# Patient Record
Sex: FEMALE | URBAN - METROPOLITAN AREA
[De-identification: names, ages, dates, MRNs, and addresses within clinical notes are randomized per-mention and may not be internally consistent; named-entity substitution may affect disease eponyms.]

---

## 2023-05-03 ENCOUNTER — TELEPHONE (OUTPATIENT)
Dept: PULMONOLOGY | Facility: CLINIC | Age: 73
End: 2023-05-03

## 2023-05-03 NOTE — CONFIDENTIAL NOTE
ILD New Patient Referral: Pre visit communication    Patient: Rosa Mejia  Reason for Referral: ILD  Referring Physician: Self    Chest CT scan: YES. Date-April 2023. Location-[place], [place].  Biopsy: NO  PFT's:No  Additional testing: NO    Current symptoms: Mild SOB. Re accruing bhroncitis. Cough  Current related prescriptions: No    Supplemental oxygen? No    In review of apparent records and conversation with patient; recommend first visit with ILD provider be conducted :  In person visit  Testing needed: CT scan and Full PFT's and walk    Additional notes: Clinic coordinator will retain CT images from [facility]. ILD provider to review when received.                                                             Stacie Joseph RN

## 2023-05-08 DIAGNOSIS — J84.9 ILD (INTERSTITIAL LUNG DISEASE) (H): Primary | ICD-10-CM

## 2023-07-19 ENCOUNTER — OFFICE VISIT (OUTPATIENT)
Dept: PULMONOLOGY | Facility: CLINIC | Age: 73
End: 2023-07-19
Attending: INTERNAL MEDICINE
Payer: MEDICARE

## 2023-07-19 ENCOUNTER — LAB (OUTPATIENT)
Dept: LAB | Facility: CLINIC | Age: 73
End: 2023-07-19
Payer: MEDICARE

## 2023-07-19 VITALS
OXYGEN SATURATION: 97 % | DIASTOLIC BLOOD PRESSURE: 77 MMHG | HEART RATE: 75 BPM | BODY MASS INDEX: 20.91 KG/M2 | HEIGHT: 63 IN | WEIGHT: 118 LBS | SYSTOLIC BLOOD PRESSURE: 110 MMHG

## 2023-07-19 DIAGNOSIS — J84.9 ILD (INTERSTITIAL LUNG DISEASE) (H): ICD-10-CM

## 2023-07-19 DIAGNOSIS — R05.3 CHRONIC COUGH: ICD-10-CM

## 2023-07-19 DIAGNOSIS — J84.9 ILD (INTERSTITIAL LUNG DISEASE) (H): Primary | ICD-10-CM

## 2023-07-19 LAB
6 MIN WALK (FT): 1350 FT
6 MIN WALK (M): 411 M
CK SERPL-CCNC: 104 U/L (ref 26–192)
CRP SERPL-MCNC: <3 MG/L
ERYTHROCYTE [SEDIMENTATION RATE] IN BLOOD BY WESTERGREN METHOD: 13 MM/HR (ref 0–30)

## 2023-07-19 PROCEDURE — 86160 COMPLEMENT ANTIGEN: CPT | Performed by: INTERNAL MEDICINE

## 2023-07-19 PROCEDURE — 82085 ASSAY OF ALDOLASE: CPT | Performed by: INTERNAL MEDICINE

## 2023-07-19 PROCEDURE — 99000 SPECIMEN HANDLING OFFICE-LAB: CPT | Performed by: PATHOLOGY

## 2023-07-19 PROCEDURE — 86235 NUCLEAR ANTIGEN ANTIBODY: CPT | Performed by: INTERNAL MEDICINE

## 2023-07-19 PROCEDURE — 94375 RESPIRATORY FLOW VOLUME LOOP: CPT | Performed by: INTERNAL MEDICINE

## 2023-07-19 PROCEDURE — 86331 IMMUNODIFFUSION OUCHTERLONY: CPT | Performed by: INTERNAL MEDICINE

## 2023-07-19 PROCEDURE — G0463 HOSPITAL OUTPT CLINIC VISIT: HCPCS | Performed by: INTERNAL MEDICINE

## 2023-07-19 PROCEDURE — 94726 PLETHYSMOGRAPHY LUNG VOLUMES: CPT | Performed by: INTERNAL MEDICINE

## 2023-07-19 PROCEDURE — 86225 DNA ANTIBODY NATIVE: CPT | Performed by: INTERNAL MEDICINE

## 2023-07-19 PROCEDURE — 82550 ASSAY OF CK (CPK): CPT | Performed by: PATHOLOGY

## 2023-07-19 PROCEDURE — 99205 OFFICE O/P NEW HI 60 MIN: CPT | Mod: 25 | Performed by: INTERNAL MEDICINE

## 2023-07-19 PROCEDURE — 86038 ANTINUCLEAR ANTIBODIES: CPT | Performed by: INTERNAL MEDICINE

## 2023-07-19 PROCEDURE — 86606 ASPERGILLUS ANTIBODY: CPT | Performed by: INTERNAL MEDICINE

## 2023-07-19 PROCEDURE — 86140 C-REACTIVE PROTEIN: CPT | Performed by: PATHOLOGY

## 2023-07-19 PROCEDURE — 86431 RHEUMATOID FACTOR QUANT: CPT | Performed by: INTERNAL MEDICINE

## 2023-07-19 PROCEDURE — 86200 CCP ANTIBODY: CPT | Performed by: INTERNAL MEDICINE

## 2023-07-19 PROCEDURE — 94729 DIFFUSING CAPACITY: CPT | Performed by: INTERNAL MEDICINE

## 2023-07-19 PROCEDURE — 86036 ANCA SCREEN EACH ANTIBODY: CPT | Performed by: INTERNAL MEDICINE

## 2023-07-19 PROCEDURE — 85652 RBC SED RATE AUTOMATED: CPT | Performed by: PATHOLOGY

## 2023-07-19 PROCEDURE — 82787 IGG 1 2 3 OR 4 EACH: CPT | Performed by: INTERNAL MEDICINE

## 2023-07-19 PROCEDURE — 36415 COLL VENOUS BLD VENIPUNCTURE: CPT | Performed by: PATHOLOGY

## 2023-07-19 RX ORDER — MAGNESIUM 200 MG
TABLET ORAL DAILY
COMMUNITY

## 2023-07-19 RX ORDER — CALCIUM CARBONATE/VITAMIN D3 500 MG-10
1 TABLET,CHEWABLE ORAL
COMMUNITY

## 2023-07-19 ASSESSMENT — PAIN SCALES - GENERAL: PAINLEVEL: NO PAIN (0)

## 2023-07-19 NOTE — PATIENT INSTRUCTIONS
Take Famotidine (aka Pepcid) 20mg twice a day (30-60 minute before a meal)  Don't lay down for at least one hour after eating (two hours ideally)  Elevate your chest to help with reflux by either using a wedge pillow or by raising just the head of the bed with bed raisers by 6-8 inches

## 2023-07-19 NOTE — PROGRESS NOTES
New Pulmonary Patient Clinic Note   07/19/2023      PCP: No primary care provider on file.    Reason for visit: ILD    Pulmonary HPI:   Rosa Mejia is a 73 year old female w/ PMH of osteopenia (on bisphosphonate) presents for evaluation of ILD.    She was recently seen virtually at [facility]. She has PFTs from June and a CT Chest April of this year. Previous to this, had a CXR from [place], but otherwise due to being in otherwise good health, has had no real testing.   Due to her acute worsening of cough, she had a CXR performed in [place] this last spring. CXR showed areas of apical scarring prompting the CT Chest.  Regarding her cough, reports it has been ongong for a few years. She denies nasal/sinus symptoms and heartburn.  Cough is non-productive. She does have a hoarse voice (also prolonged) and notices her cough becomes worse after certain foods (coffee, wine, etc).  This has prompted her to cutback on wine and started on nexium; she may have noticed a difference (did not cough during interview which she states is an improvement). No nighttime coughing.  No triggers from environment (scents/cold temps/animals). However, she is worried regarding calcium absorption with PPIs. She is not doing lifestyle changes regarding reflux.  She otherwise denies SOB symptoms (lives active lifestyle, just biked for a few hours earlier this week), chest pain/tighness, wheezing/stridor, heart failure symptoms including orthopnea, rashes, oral ulcers, fevers/weight loss/night sweats, eye symptoms, GI symptoms, urinary symptoms, joint symptoms (mention some R thumb pain and right anterior knee pain which is new and both intermittent and without swelling/erythema).  Her and her  have moved multiple times due to his job, including [place], [place], [place], and now [place]. They also travel internationally as well.  2013 /2014 she traveled to [place] and becoming ill after returning for several weeks with some type of  respiratory and multisystemic illness.  She reports remodeling a home in 7254-9777 period that had significant mold present. She was to involevd in the clearing it and did not spend significant time in the affected areas. The same home did have asbestos in the walls and she was in the home when it was being removed (though not the one removing it).   Denies any other house having mold as any issue. Reports that one house would have to be cleaned vis spray (made of brown stone), but no actual sandblasting.   They lived near a golf course while in [place], though she did not spend much time on it. Home did have a hot tub but she did not spend much time in or around it.  Pets: included dogs. No birds or unintended exposure to birds.  She has had many down feather products in her home (jackets, blankets, pillows) for many years.  She recently stopped using them after discussion with the pulmonologist at [place].   Hobbies: have previously included gardening, though in the last year, she is not gardening that much. Does not play instruments.   No smoking history. Does report secondhand smoke as child (mother was heavy smoker).  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       Patient's outside records were reviewed via Care Everywhere &/or available paper records (sent for scanning).     Review of systems: a complete 12-point ROS conducted, & found to be negative w/ exceptions as noted in the HPI.    Past medical history:  Osteopenia    No past surgical history on file.    Social history:   Non-smoker    Family history:  Unclear of paternal side.  CAD on maternal side    Medications:  Current Outpatient Medications   Medication     Calcium Carb-Cholecalciferol 500-10 MG-MCG CHEW     cyanocobalamin 1000 MCG sublingual tablet     No current facility-administered  "medications for this visit.   Reports being zoledronic acid    Allergies:  No Known Allergies    Physical examination:  /77 (BP Location: Right arm, Patient Position: Sitting, Cuff Size: Adult Regular)   Pulse 75   Ht 1.6 m (5' 3\")   Wt 53.5 kg (118 lb)   SpO2 97%   BMI 20.90 kg/m      General: NAD  Eyes: Anicteric sclera, PERRL, EOMI  Nose: Nasal mucosa w/o edema or hyperemia; no nasal polyps  Mouth: MMM w/o lesions; no tonsillar enlargement; no oropharyngeal exudate, or erythema  Neck: No masses, no thyromegaly, no stridor  CV: RR, no m/c/r;   Lungs: Intermittent squeal with inspiratory rlaes on right base. Anterior chest clear.   Abd: Soft, NT, ND  Ext: WWP, No BLE edema. No clubbing  Skin: No rashes, cyanosis, or jaundice. No nailbed issues.   Neuro: No focal deficits  Psych: Euthymic, normal affect, good eye contact    Labs: reviewed in [facility] & personally interpreted.     June 2023 labs at [facility]-   HP panel 1 and Daniel panel, MARIALUISA, RNP, SSA/SSB, SM, Scl-70, Ary-1, CBC, CMP.  All negative except  MARIALUISA- 1:320 homogenous on immunofluorescence and RNP- 1.8  CMP with HCO3-32.  CBC w/diff- abs eosinophils 0.16    Imaging: reviewed in [facility] & personally interpreted. Below are the interpretations from the formal Radiology review.  Personal review of outside CT (high res, 4/28/2023)-  Apical b/l scarring with consolidation on R and small area of bronchiectasis within it  Mild peripheral reticular changes with areas of GGO (subtle until the lower lobes) with mosaicism all throughout/ air trapping evident on expiratory films.  Distal esophagus is mildly dilated/thickened.  PA normal size.   Calcified granuloma in right lung.  Will need to review with radiology.    PFT:  Most Recent [facility] Pulmonary Function Testing (FVC/FEV1 only)  FVC-Pre   Date Value Ref Range Status   07/19/2023 1.95 L      FVC-%Pred-Pre   Date Value Ref Range Status   07/19/2023 77 %      FEV1-Pre   Date Value Ref Range Status   07/19/2023 1.60 L " "     FEV1-%Pred-Pre   Date Value Ref Range Status   07/19/2023 81 %    TLC-3.79L (79%)  RV/TLC- 49%  DLCO- 15.29 ml/min/mmHg (82%)  Overall, not significant changes vs 6/2023 PFTs.  Now considered mild restriction with normal DLCO. Note, all values still very similar and likely just variance. General trend is that all values are mildly low or on low end of normal.    6MW- Date: 7/19/23   Distance-  411m (379LLN), bora spO2-91% (at very end, otherwise mid-90's)    PFT- Date: 6/7/2023 at [place]  FEV1- 1.64L (80%), FVC-2.06L (78%), FEV1/FVC- 0.79, BDR-No; TLC-4.1 (87%), RV/TLC-50% uncDLCO- 13.49 ml/min/mmHg (74%)-    Interpretation- \"Abnormal study. Diffusing capacity is reduced consistent with a pulmonary parenchymal or vascular process. Oxygen saturations normal at rest and with exercise. Spirometry and lung volumes otherwise normal. No acute bronchodilator response.\"        Impression & recommendations:    Rosa was seen today for interstitial lung disease (ild).    Diagnoses and all orders for this visit:    ILD (interstitial lung disease) (H)  -     Anti Nuclear Daria IgG by IFA with Reflex; Future  -     Erythrocyte sedimentation rate auto; Future  -     CRP inflammation; Future  -     CK total; Future  -     Ary 1 Antibody IgG; Future  -     Scleroderma Antibody Scl70 LAURA IgG; Future  -     Rheumatoid factor; Future  -     SSA Ro LAURA Antibody IgG; Future  -     SSB La LAURA Antibody IgG; Future  -     Aldolase; Future  -     Hypersensitivity pneumonitis; Future  -     Hypersensitivity Pneumonitis 2 - Farmer's Lung; Future  -     ANCA IgG by IFA with Reflex to Titer; Future  -     IgG Subclasses; Future  -     Cyclic Citrullinated Peptide Antibody IgG; Future  -     General PFT Lab (Please always keep checked); Future  -     Pulmonary Function Test; Future  -     Complement C4; Future  -     Complement C3; Future  -     Fuller LAURA Antibody IgG; Future  -     RNP Antibody IgG; Future  -     DNA double stranded " antibodies; Future    Chronic cough    Has responded to diet changes and PPI.  -Can switch to famotidine 20mg BID  -Lifestyle modifications iscussed    Pt is asymptomatic and based on 6MW does not have an exercise intolerance or become hypoxemic with exertion.   However, PFTs show values that are General trend is that all values are mildly low or on low end of normal.  History significant for heavy down feather product exposure, previous gardening, and distant exposures to asbestos (and less likely mold).   She has already started removing down feather products from her areas of sleep and stopped wearing it.   CT Chest shows pattern that maybe consistent with chronic HP vs early NSIP; features on CT would suggest alternative to UIP though indeterminate for UIP also possible.   Previous limited ILD lab work up showed MARIALUISA+ homogenous 1:320 (snor overwhelmingly positive) and +RNP ab.  Exam and history does not suggest CTD. HP panel 1 and sergio panel were negative.   Considering the RNP ab, pattern on CT, and only limited set of data from previous labs with the +MARIALUISA, would repeat with full ILD panel with inflammatory markers at this time.   Has chronic cough that seems to have responded to PPI treatment. However, in addition to lifestyle modification (diet, chest elevation, avoid eating 1-2 hours before bed), can switch to famotidine 20mg BID.        RTC in 3 months with PFTs      60 minutes excluding the time spent on cigarette cessation was  spent on the date of the encounter doing chart review, history and exam, documentation and further activities as noted above.    These conclusions are made at the best of one's knowledge and belief based on the provided evidence such as patient's history and allergy test results and they can change over time or can be incomplete because of missing information's.    I explained the lab values, imagings and findings to the patient.  Patient expressed understanding I did not  recognize any barriers to the understanding of the patient.    The above note was dictated using voice recognition software and may include typographical errors. Please contact the author for any clarifications.    Hamlet Deluna MD  , Division of Pulmonary/Critical Care

## 2023-07-19 NOTE — LETTER
7/19/2023         RE: Rosa Mejia  [address]  [address]  [place] [address]        Dear Colleague,    Thank you for referring your patient, Rosa Mejia, to the [facility] AND [facility] [place]. Please see a copy of my visit note below.    New Pulmonary Patient Clinic Note   07/19/2023      PCP: No primary care provider on file.    Reason for visit: ILD    Pulmonary HPI:   Rosa Mejia is a 73 year old female w/ PMH of osteopenia (on bisphosphonate) presents for evaluation of ILD.    She was recently seen virtually at [facility]. She has PFTs from June and a CT Chest April of this year. Previous to this, had a CXR from [place], but otherwise due to being in otherwise good health, has had no real testing.   Due to her acute worsening of cough, she had a CXR performed in [place] this last spring. CXR showed areas of apical scarring prompting the CT Chest.  Regarding her cough, reports it has been ongong for a few years. She denies nasal/sinus symptoms and heartburn.  Cough is non-productive. She does have a hoarse voice (also prolonged) and notices her cough becomes worse after certain foods (coffee, wine, etc).  This has prompted her to cutback on wine and started on nexium; she may have noticed a difference (did not cough during interview which she states is an improvement). No nighttime coughing.  No triggers from environment (scents/cold temps/animals). However, she is worried regarding calcium absorption with PPIs. She is not doing lifestyle changes regarding reflux.  She otherwise denies SOB symptoms (lives active lifestyle, just biked for a few hours earlier this week), chest pain/tighness, wheezing/stridor, heart failure symptoms including orthopnea, rashes, oral ulcers, fevers/weight loss/night sweats, eye symptoms, GI symptoms, urinary symptoms, joint symptoms (mention some R thumb pain and right anterior knee pain which is new and both intermittent and  without swelling/erythema).  Her and her  have moved multiple times due to his job, including [place], [place], [place], and now [place]. They also travel internationally as well.  2013 /2014 she traveled to [place] and becoming ill after returning for several weeks with some type of respiratory and multisystemic illness.  She reports remodeling a home in 5914-9334 period that had significant mold present. She was to involevd in the clearing it and did not spend significant time in the affected areas. The same home did have asbestos in the walls and she was in the home when it was being removed (though not the one removing it).   Denies any other house having mold as any issue. Reports that one house would have to be cleaned vis spray (made of brown stone), but no actual sandblasting.   They lived near a golf course while in [place], though she did not spend much time on it. Home did have a hot tub but she did not spend much time in or around it.  Pets: included dogs. No birds or unintended exposure to birds.  She has had many down feather products in her home (jackets, blankets, pillows) for many years.  She recently stopped using them after discussion with the pulmonologist at [place].   Hobbies: have previously included gardening, though in the last year, she is not gardening that much. Does not play instruments.   No smoking history. Does report secondhand smoke as child (mother was heavy smoker).  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       Patient's outside records were reviewed via Care Everywhere &/or available paper records (sent for scanning).     Review of systems: a complete 12-point ROS conducted, & found to be negative w/ exceptions as noted in the HPI.    Past medical history:  Osteopenia    No past surgical history on  "file.    Social history:   Non-smoker    Family history:  Unclear of paternal side.  CAD on maternal side    Medications:  Current Outpatient Medications   Medication     Calcium Carb-Cholecalciferol 500-10 MG-MCG CHEW     cyanocobalamin 1000 MCG sublingual tablet     No current facility-administered medications for this visit.   Reports being zoledronic acid    Allergies:  No Known Allergies    Physical examination:  /77 (BP Location: Right arm, Patient Position: Sitting, Cuff Size: Adult Regular)   Pulse 75   Ht 1.6 m (5' 3\")   Wt 53.5 kg (118 lb)   SpO2 97%   BMI 20.90 kg/m      General: NAD  Eyes: Anicteric sclera, PERRL, EOMI  Nose: Nasal mucosa w/o edema or hyperemia; no nasal polyps  Mouth: MMM w/o lesions; no tonsillar enlargement; no oropharyngeal exudate, or erythema  Neck: No masses, no thyromegaly, no stridor  CV: RR, no m/c/r;   Lungs: Intermittent squeal with inspiratory rlaes on right base. Anterior chest clear.   Abd: Soft, NT, ND  Ext: WWP, No BLE edema. No clubbing  Skin: No rashes, cyanosis, or jaundice. No nailbed issues.   Neuro: No focal deficits  Psych: Euthymic, normal affect, good eye contact    Labs: reviewed in [facility] & personally interpreted.     June 2023 labs at [place]-   HP panel 1 and Daniel panel, MARIALUISA, RNP, SSA/SSB, SM, Scl-70, Ary-1, CBC, CMP.  All negative except  MARIALUISA- 1:320 homogenous on immunofluorescence and RNP- 1.8  CMP with HCO3-32.  CBC w/diff- abs eosinophils 0.16    Imaging: reviewed in [facility] & personally interpreted. Below are the interpretations from the formal Radiology review.  Personal review of outside CT (high res, 4/28/2023)-  Apical b/l scarring with consolidation on R and small area of bronchiectasis within it  Mild peripheral reticular changes with areas of GGO (subtle until the lower lobes) with mosaicism all throughout/ air trapping evident on expiratory films.  Distal esophagus is mildly dilated/thickened.  PA normal size.   Calcified granuloma in right " "lung.  Will need to review with radiology.    PFT:  Most Recent [facility]ze Pulmonary Function Testing (FVC/FEV1 only)  FVC-Pre   Date Value Ref Range Status   07/19/2023 1.95 L      FVC-%Pred-Pre   Date Value Ref Range Status   07/19/2023 77 %      FEV1-Pre   Date Value Ref Range Status   07/19/2023 1.60 L      FEV1-%Pred-Pre   Date Value Ref Range Status   07/19/2023 81 %    TLC-3.79L (79%)  RV/TLC- 49%  DLCO- 15.29 ml/min/mmHg (82%)  Overall, not significant changes vs 6/2023 PFTs.  Now considered mild restriction with normal DLCO. Note, all values still very similar and likely just variance. General trend is that all values are mildly low or on low end of normal.    6MW- Date: 7/19/23   Distance-  411m (379LLN), bora spO2-91% (at very end, otherwise mid-90's)    PFT- Date: 6/7/2023 at [place]  FEV1- 1.64L (80%), FVC-2.06L (78%), FEV1/FVC- 0.79, BDR-No; TLC-4.1 (87%), RV/TLC-50% uncDLCO- 13.49 ml/min/mmHg (74%)-    Interpretation- \"Abnormal study. Diffusing capacity is reduced consistent with a pulmonary parenchymal or vascular process. Oxygen saturations normal at rest and with exercise. Spirometry and lung volumes otherwise normal. No acute bronchodilator response.\"        Impression & recommendations:    Rosa was seen today for interstitial lung disease (ild).    Diagnoses and all orders for this visit:    ILD (interstitial lung disease) (H)  -     Anti Nuclear Daria IgG by IFA with Reflex; Future  -     Erythrocyte sedimentation rate auto; Future  -     CRP inflammation; Future  -     CK total; Future  -     Ary 1 Antibody IgG; Future  -     Scleroderma Antibody Scl70 LAURA IgG; Future  -     Rheumatoid factor; Future  -     SSA Ro LAURA Antibody IgG; Future  -     SSB La LAURA Antibody IgG; Future  -     Aldolase; Future  -     Hypersensitivity pneumonitis; Future  -     Hypersensitivity Pneumonitis 2 - Farmer's Lung; Future  -     ANCA IgG by IFA with Reflex to Titer; Future  -     IgG Subclasses; Future  -     Cyclic " Citrullinated Peptide Antibody IgG; Future  -     General PFT Lab (Please always keep checked); Future  -     Pulmonary Function Test; Future  -     Complement C4; Future  -     Complement C3; Future  -     Fuller LAURA Antibody IgG; Future  -     RNP Antibody IgG; Future  -     DNA double stranded antibodies; Future    Chronic cough    Has responded to diet changes and PPI.  -Can switch to famotidine 20mg BID  -Lifestyle modifications iscussed    Pt is asymptomatic and based on 6MW does not have an exercise intolerance or become hypoxemic with exertion.   However, PFTs show values that are General trend is that all values are mildly low or on low end of normal.  History significant for heavy down feather product exposure, previous gardening, and distant exposures to asbestos (and less likely mold).   She has already started removing down feather products from her areas of sleep and stopped wearing it.   CT Chest shows pattern that maybe consistent with chronic HP vs early NSIP; features on CT would suggest alternative to UIP though indeterminate for UIP also possible.   Previous limited ILD lab work up showed MARIALUISA+ homogenous 1:320 (snor overwhelmingly positive) and +RNP ab.  Exam and history does not suggest CTD. HP panel 1 and sergio panel were negative.   Considering the RNP ab, pattern on CT, and only limited set of data from previous labs with the +MARIALUISA, would repeat with full ILD panel with inflammatory markers at this time.   Has chronic cough that seems to have responded to PPI treatment. However, in addition to lifestyle modification (diet, chest elevation, avoid eating 1-2 hours before bed), can switch to famotidine 20mg BID.        RTC in 3 months with PFTs      60 minutes excluding the time spent on cigarette cessation was  spent on the date of the encounter doing chart review, history and exam, documentation and further activities as noted above.    These conclusions are made at the best of one's knowledge  and belief based on the provided evidence such as patient's history and allergy test results and they can change over time or can be incomplete because of missing information's.    I explained the lab values, imagings and findings to the patient.  Patient expressed understanding I did not recognize any barriers to the understanding of the patient.    The above note was dictated using voice recognition software and may include typographical errors. Please contact the author for any clarifications.    Hamlet Deluna MD  , Division of Pulmonary/Critical Care              Again, thank you for allowing me to participate in the care of your patient.        Sincerely,        Hamlet Deluna MD

## 2023-07-19 NOTE — NURSING NOTE
Met with pt and spouse. Reviewed plan of care, answered all questions related. RN gave pt direct nurse line and information on pt education day in October. Pt will have labs drawn today.   Ronna Schulz RN BSN

## 2023-07-19 NOTE — NURSING NOTE
Chief Complaint   Patient presents with    Interstitial Lung Disease (ILD)     ILD New        Vitals were taken and medications were reconciled.     Liz Rocha RMA  3:54 PM

## 2023-07-20 LAB
ANA SER QL IF: NEGATIVE
CCP AB SER IA-ACNC: 1 U/ML
DLCOUNC-%PRED-PRE: 82 %
DLCOUNC-PRE: 15.29 ML/MIN/MMHG
DLCOUNC-PRED: 18.44 ML/MIN/MMHG
DSDNA AB SER-ACNC: 1.5 IU/ML
ENA SM IGG SER IA-ACNC: <0.7 U/ML
ENA SM IGG SER IA-ACNC: NEGATIVE
ENA SS-A AB SER IA-ACNC: 1.5 U/ML
ENA SS-A AB SER IA-ACNC: NEGATIVE
ENA SS-B IGG SER IA-ACNC: <0.6 U/ML
ENA SS-B IGG SER IA-ACNC: NEGATIVE
ERV-%PRED-PRE: 74 %
ERV-PRE: 0.51 L
ERV-PRED: 0.68 L
EXPTIME-PRE: 6.01 SEC
FEF2575-%PRED-PRE: 110 %
FEF2575-PRE: 1.88 L/SEC
FEF2575-PRED: 1.7 L/SEC
FEFMAX-%PRED-PRE: 117 %
FEFMAX-PRE: 6.36 L/SEC
FEFMAX-PRED: 5.42 L/SEC
FEV1-%PRED-PRE: 81 %
FEV1-PRE: 1.6 L
FEV1FEV6-PRE: 84 %
FEV1FEV6-PRED: 79 %
FEV1FVC-PRE: 82 %
FEV1FVC-PRED: 79 %
FEV1SVC-PRE: 83 %
FEV1SVC-PRED: 69 %
FIFMAX-PRE: 3.61 L/SEC
FRCPLETH-%PRED-PRE: 89 %
FRCPLETH-PRE: 2.38 L
FRCPLETH-PRED: 2.68 L
FVC-%PRED-PRE: 77 %
FVC-PRE: 1.95 L
FVC-PRED: 2.5 L
IC-%PRED-PRE: 66 %
IC-PRE: 1.41 L
IC-PRED: 2.12 L
RVPLETH-%PRED-PRE: 98 %
RVPLETH-PRE: 1.87 L
RVPLETH-PRED: 1.9 L
TLCPLETH-%PRED-PRE: 79 %
TLCPLETH-PRE: 3.79 L
TLCPLETH-PRED: 4.8 L
U1 SNRNP IGG SER IA-ACNC: 2 U/ML
U1 SNRNP IGG SER IA-ACNC: NEGATIVE
VA-%PRED-PRE: 67 %
VA-PRE: 2.99 L
VC-%PRED-PRE: 67 %
VC-PRE: 1.92 L
VC-PRED: 2.86 L

## 2023-07-21 LAB
ALDOLASE SERPL-CCNC: 4.5 U/L
ANCA AB PATTERN SER IF-IMP: NORMAL
C-ANCA TITR SER IF: NORMAL {TITER}
C3 SERPL-MCNC: 115 MG/DL (ref 81–157)
C4 SERPL-MCNC: 26 MG/DL (ref 13–39)
ENA JO1 AB SER IA-ACNC: <0.5 U/ML
ENA JO1 IGG SER-ACNC: NEGATIVE
ENA SCL70 IGG SER IA-ACNC: 0.8 U/ML
ENA SCL70 IGG SER IA-ACNC: NEGATIVE
IGG SERPL-MCNC: 1228 MG/DL (ref 610–1616)
IGG1 SER-MCNC: 763 MG/DL (ref 382–929)
IGG2 SER-MCNC: 272 MG/DL (ref 242–700)
IGG3 SER-MCNC: 62 MG/DL (ref 22–176)
IGG4 SER-MCNC: 95 MG/DL (ref 4–86)
RHEUMATOID FACT SER NEPH-ACNC: <6 IU/ML
SUBCLASSES, PERCENT: 97 %

## 2023-07-26 LAB
A FLAVUS AB SER QL ID: NORMAL
A FUMIGATUS1 AB SER QL ID: NORMAL
A FUMIGATUS2 AB SER QL ID: NORMAL
A FUMIGATUS3 AB SER QL ID: NORMAL
A FUMIGATUS6 AB SER QL ID: NORMAL
A PULLULANS AB SER QL ID: NORMAL
PIGEON SERUM AB QL ID: NORMAL
S RECTIVIRGULA AB SER QL ID: NORMAL
S VIRIDIS AB SER QL ID: NORMAL
T CANDIDUS AB SER QL: NORMAL

## 2023-08-07 ENCOUNTER — PATIENT OUTREACH (OUTPATIENT)
Dept: PULMONOLOGY | Facility: CLINIC | Age: 73
End: 2023-08-07
Payer: MEDICARE

## 2023-08-07 NOTE — PROGRESS NOTES
Patient contact regarding cough and cold symptoms. Cough production, white in color. Started about 2-3 days ago. Using over the counter cough syrup for now. No fevers. Dicussed that symtposm sounds viral at this time. Instructed her to continue to monitor and if symptoms worsen, has colored sputum or other concerns to call back.     Also reviewed discussion and plan from ILD conference today. Answered patients questions and explained diagnosis. Happy to get explanation and agrees with plan.

## 2023-08-13 ENCOUNTER — HEALTH MAINTENANCE LETTER (OUTPATIENT)
Age: 73
End: 2023-08-13

## 2023-08-14 ENCOUNTER — TELEPHONE (OUTPATIENT)
Dept: PULMONOLOGY | Facility: CLINIC | Age: 73
End: 2023-08-14
Payer: MEDICARE

## 2023-08-14 DIAGNOSIS — J84.9 ILD (INTERSTITIAL LUNG DISEASE) (H): Primary | ICD-10-CM

## 2023-08-14 RX ORDER — DOXYCYCLINE HYCLATE 100 MG
100 TABLET ORAL 2 TIMES DAILY
Qty: 10 TABLET | Refills: 0 | Status: SHIPPED | OUTPATIENT
Start: 2023-08-14

## 2023-08-14 NOTE — TELEPHONE ENCOUNTER
RN spoke with pt. She continues to decline. Chills, harsh productive cough, chills, chest pain at top of ribs, hoarse voice. Pt was in [place] visiting son where she thinks she picked this up. NKDA. Pt would like [facility] pharmacy. RN called and spoke with Dr. Deluna who advised Doxy 100mg BID for 5 days. RN sent to requested pharmacy and updated pt.   Ronna Schulz RN BSN

## 2023-08-14 NOTE — TELEPHONE ENCOUNTER
----- Message from Stacie Joseph RN sent at 8/14/2023  7:31 AM CDT -----  Regarding: VM  Pt left VM on 8/11 at 1800. Has a bad chest and head cold with productive cough. Wondering if she should be on an abx.     [phone]

## 2024-09-06 ENCOUNTER — MYC MEDICAL ADVICE (OUTPATIENT)
Dept: PULMONOLOGY | Facility: CLINIC | Age: 74
End: 2024-09-06
Payer: MEDICARE

## 2024-09-06 DIAGNOSIS — J84.9 ILD (INTERSTITIAL LUNG DISEASE) (H): Primary | ICD-10-CM

## 2024-09-10 ENCOUNTER — TELEPHONE (OUTPATIENT)
Dept: PULMONOLOGY | Facility: CLINIC | Age: 74
End: 2024-09-10
Payer: MEDICARE

## 2024-09-10 NOTE — TELEPHONE ENCOUNTER
----- Message from Richy MCNEIL sent at 9/10/2024  1:58 PM CDT -----  Regarding: RE: Schedule appt  I put her down for January, but she is having a productive cough and I told her I was going to have one of you call her - because you care soooo much about our pts and you will do that, right ? :):):)  ----- Message -----  From: Stacie Joseph, RN  Sent: 9/5/2024   2:50 PM CDT  To: Richy May  Subject: Schedule appt                                    MICHAEL Garcia    Pt lvm in ILD nurse line. She seen Deluna in 7/2023. She was also going to the [facility] at that time. She is now wanting to only be seen here and would like to get on the schedule.    # [phone]

## 2024-09-10 NOTE — TELEPHONE ENCOUNTER
Patient initally called to schedule an appointment to re-establish care  Patient had gone tot he [place] a few times but found the location difficult to get to. Last seen at the [facility] 6/2024  Patient's PCP encouraged patient to re-establish closer and PCP is also impressed with the [facility] ILD program    Patient is also reporting symptoms and is looking for suggestions.   Patient is reporting productive cough and frequent, with white sputum  Denies fever  Denies SOB    No medications at the moment, tried Flonase with little success    Nurse will send message to Dr. Regi Nassar, RN

## 2024-09-11 NOTE — TELEPHONE ENCOUNTER
Patient agreeable to coming on Nov 20  Patient understanding that it is hard to treat without having been seen recently    Nurse will send Jose a message Ok to schedule    Jessica Nassar RN

## 2024-10-06 ENCOUNTER — HEALTH MAINTENANCE LETTER (OUTPATIENT)
Age: 74
End: 2024-10-06

## 2024-11-20 ENCOUNTER — OFFICE VISIT (OUTPATIENT)
Dept: PULMONOLOGY | Facility: CLINIC | Age: 74
End: 2024-11-20
Attending: INTERNAL MEDICINE
Payer: MEDICARE

## 2024-11-20 VITALS
DIASTOLIC BLOOD PRESSURE: 73 MMHG | HEART RATE: 70 BPM | HEIGHT: 63 IN | OXYGEN SATURATION: 97 % | WEIGHT: 117 LBS | BODY MASS INDEX: 20.73 KG/M2 | SYSTOLIC BLOOD PRESSURE: 117 MMHG

## 2024-11-20 DIAGNOSIS — R05.3 CHRONIC COUGH: Primary | ICD-10-CM

## 2024-11-20 DIAGNOSIS — J84.9 ILD (INTERSTITIAL LUNG DISEASE) (H): ICD-10-CM

## 2024-11-20 DIAGNOSIS — J67.9 HYPERSENSITIVITY PNEUMONIA (H): ICD-10-CM

## 2024-11-20 DIAGNOSIS — J98.4 SMALL AIRWAYS DISEASE: ICD-10-CM

## 2024-11-20 LAB
6 MIN WALK (FT): 1500 FT
6 MIN WALK (M): 457 M

## 2024-11-20 PROCEDURE — 99215 OFFICE O/P EST HI 40 MIN: CPT | Mod: 25 | Performed by: INTERNAL MEDICINE

## 2024-11-20 PROCEDURE — 99417 PROLNG OP E/M EACH 15 MIN: CPT | Performed by: INTERNAL MEDICINE

## 2024-11-20 PROCEDURE — G0463 HOSPITAL OUTPT CLINIC VISIT: HCPCS | Performed by: INTERNAL MEDICINE

## 2024-11-20 RX ORDER — FLUTICASONE FUROATE AND VILANTEROL 100; 25 UG/1; UG/1
1 POWDER RESPIRATORY (INHALATION) DAILY
Qty: 28 EACH | Refills: 0 | Status: SHIPPED | OUTPATIENT
Start: 2024-11-20

## 2024-11-20 RX ORDER — FLUTICASONE PROPIONATE AND SALMETEROL XINAFOATE 115; 21 UG/1; UG/1
2 AEROSOL, METERED RESPIRATORY (INHALATION) 2 TIMES DAILY
Qty: 12 G | Refills: 0 | Status: SHIPPED | OUTPATIENT
Start: 2024-11-20

## 2024-11-20 RX ORDER — BENZONATATE 100 MG/1
100 CAPSULE ORAL 3 TIMES DAILY PRN
COMMUNITY

## 2024-11-20 ASSESSMENT — PAIN SCALES - GENERAL: PAINLEVEL_OUTOF10: NO PAIN (0)

## 2024-11-20 NOTE — LETTER
11/20/2024      Rosa Mejia  [address]  [address]  [place] [address]      Dear Colleague,    Thank you for referring your patient, Rosa Mejia, to the [facility] [place], [place] FOR LUNG SCIENCE AND [facility] [place]. Please see a copy of my visit note below.    Pulmonary Patient Follow Up Clinic Note   11/20/2024      PCP: No Ref-Primary, Physician    Reason for visit: ILD follow    Pulmonary HPI:   Rosa Mejia is a 74 year old female w/ h/o life long non smoker, osteopenia, who presents for follow up of ILD and for her chronic cough.     Pt presents with her .     Last [facility] visit 7/19/2023.   She has also been followed at [place] [address] appt previous to that visit and has also followed up there twice since the [facility] appt- last appt with [facility] was 6/11/2024.       Previous exposure hx- long term down feather products until recently (stopped in summer of 2023). Home from [id number] had mold and asbestos but was not involved in the clearing of it.   Repeat full ILD serologies (along with SLE serologies after a +MARIALUISA Hep-2 sub and RNP at [facility]) were negative. She has no CTD symptoms previously.   PFTs- mild restriction with normal DLCO.  6MW- normal distance and desaturation.   She also had chronic non-productive cough- with +/- mild improvement with PPI and PND contributing.   ILD conference- HP. Since she just removed the down feather products, plan was to monitor and follow up.     Following appt, in September 2023 she tested +COVID-19. She met criteria for mild disease. She was treated with Paxlovid in the outpt setting.     Patient's biggest symptom at this point is a chronic cough.  Previously cough was nonproductive but over the last year and a half it has become productive.   She has also developed hoarseness of voice.  Cough is throughout the day and is productive with white sputum intermittently (mostly in the morning).  Cough is worse in the morning but is also  triggered by environmental stimuli (perfumes, diffuser oils, colder temperatures) as well as laughter and talking as well as exercise.  As such she has not been exercising as much.  She notes that her cough is not awakening her at night.  She does have some postnasal drip with rhinitis which she is treated with Flonase; it has helped control her rhinitis and postnasal drip but has not improved her cough.    No sinus issues.  Seasonal pollen changes do not seem to affect the cough.  She also notes that her cough is not positional.  She was treated with a PPI for period time but it really did not seem to make any major difference in her cough.  She had a Bravo pH study which was negative.  After the study she stopped the PPI.    She had a repeat CT chest in June 2024 at [facility] with report stating potentially mild progression on imaging compared to April 2023.    In the June 2024 appointment at [facility], Dr. Varner discussed potentially mycophenolate and Ofev but since patient has been overall asymptomatic from a dyspnea standpoint would hold off unless there is further progression.    Of note she also had 2 intermittent results from sweat chloride sweat testing with a follow-up genotype for CF genes which was negative.    She has had no infectious symptoms nor any acute symptoms on her chronic symptoms regarding infections.    She has no chest tightness or wheezing.  No chest pain.      Review of systems: a complete 12-point ROS conducted, & found to be negative w/ exceptions as noted in the HPI.    Reviewed PMH, PSH, FH, SH    Medications:  Current Outpatient Medications   Medication Sig Dispense Refill     benzonatate (TESSALON) 100 MG capsule Take 100 mg by mouth 3 times daily as needed for cough.       Calcium Carb-Cholecalciferol 500-10 MG-MCG CHEW Take 1 tablet by mouth       cyanocobalamin 1000 MCG sublingual tablet Place under the tongue daily       fluticasone-salmeterol (ADVAIR HFA) 115-21 MCG/ACT inhaler Inhale 2  "puffs into the lungs 2 times daily. 12 g 0     fluticasone-vilanterol (BREO ELLIPTA) 100-25 MCG/ACT inhaler Inhale 1 puff into the lungs daily. 28 each 0     No current facility-administered medications for this visit.       Allergies:  No Known Allergies    Physical examination:  /73   Pulse 70   Ht 1.6 m (5' 3\")   Wt 53.1 kg (117 lb)   SpO2 97%   BMI 20.73 kg/m      General: NAD  Eyes: Anicteric sclera  CV: RR, no m/c/r  Lungs: +BS. No wheezing. Mild inspiratory rales in bases of lungs in posterior lung fields. No squeaks or egophony.   Abd: Soft, NT, ND  Ext: WWP, no BLE edema. No clubbing  Skin: No rashes, cyanosis, or jaundice  Neuro: No focal deficits  Psych: Euthymic, normal affect, good eye contact    Labs: reviewed in [facility] & personally interpreted.     June 2023 labs at [facility]-   HP panel 1 and Daniel panel, MARIALUISA, RNP, SSA/SSB, SM, Scl-70, Ary-1, CBC, CMP.  All negative except  MARIALUISA- 1:320 homogenous on immunofluorescence and RNP- 1.8  CMP with HCO3-32.  CBC w/diff- abs eosinophils 0.16    Full ILD serology panel 7/2023  -Negative, including repeat full SLE serologies    Imaging: reviewed in [facility] & personally interpreted. Below are the interpretations from the formal Radiology review.    Personal review of outside CT (high res, 4/28/2023)-  Apical b/l scarring with consolidation on R and small area of bronchiectasis within it  Mild peripheral reticular changes with areas of GGO (subtle until the lower lobes) with mosaicism all throughout/ air trapping evident on expiratory films.  Distal esophagus is mildly dilated/thickened.  PA normal size.   Calcified granuloma in right lung.    Ddx- Overall, alternative to UIP- ddx HP, NSIP with small airways disease.       [facility] Ct Chest 6/2024- report only viewed:    \"FINDINGS:   There is a fibrotic interstitial abnormality with reticular and groundglass opacities with a peripheral and basilar predominance. Associated traction bronchiectasis in some areas. No " definite honeycombing or subpleural sparing. There is also prominent   mosaic attenuation of the lung parenchyma with moderate to severe patchy bilateral areas of air trapping on the expiratory images (series 4). The findings are indeterminate for UIP. The prominent mosaic attenuation raises the possibility of   hypersensitivity pneumonitis, though the peripheral and lower lung predominance of the fibrosis is not typically seen with HP.   There has been some worsening of the fibrotic interstitial abnormality since the prior exam with increased fibrosis and groundglass opacity in the left lower lobe and inferior lingula.     Subpleural fibrosis with traction bronchiectasis and calcification in the lung apices.     Bronchiectasis in the inferior lingula and right middle lobe.   Expiratory images demonstrate excessive dynamic airway collapse with near-complete closure of the bronchus intermedius (series 4, images 115-125). The trachea and mainstem bronchi remain widely patent during expiration.   Right middle lobe calcified granulomas. No pleural effusion.     No thoracic lymphadenopathy. A few mildly prominent subcentimeter mediastinal nodes are likely reactive. Calcified right hilar lymph nodes, likely sequela of old granulomatous infection. No pericardial effusion. Bilateral small thyroid nodules measuring   up to 6 mm require no specific imaging follow-up.     Tiny hypoattenuating lesion in the right hepatic lobe, too small to characterize but likely cyst versus hemangioma. Bilateral renal cysts. Soft tissue thickening of the left adrenal gland.     Impression    Fibrotic interstitial lung disease which is indeterminate for UIP. Differential includes fibrotic NSIP. There is prominent mosaic attenuation and expiratory air trapping, which can be seen with hypersensitivity pneumonitis, though the peripheral and  lower lung distribution of the fibrotic changes is not typical for hypersensitivity pneumonitis. Connective  "tissue related ILD with a component of a obliterative bronchiolitis is possible. There has been mild worsening of the interstitial fibrosis since  the prior outside exam.\"      PFT:  Most Recent [facility] Pulmonary Function Testing (FVC/FEV1 only)  FVC-Pre   Date Value Ref Range Status   11/20/2024 1.75 L    07/19/2023 1.95 L      FVC-%Pred-Pre   Date Value Ref Range Status   11/20/2024 71 %    07/19/2023 77 %      FEV1-Pre   Date Value Ref Range Status   11/20/2024 1.49 L    07/19/2023 1.60 L      FEV1-%Pred-Pre   Date Value Ref Range Status   11/20/2024 77 %    07/19/2023 81 %      11/20/2024  Spirometry- FEV1-1.49L (77%), FVC- 1.75L (71%), no obstruction  TLC-3.32L (69%)  RV/TLC- 49%  uncDLCO- 14.51 ml/min/mmHg (79%)  -Mildly reduced FVC and TLC (mild restriction) with normal DLCO  -Significant decline in TLC compared to previous  6MW- normal distance, desat without hypoxia (spO2-94%)    [facility] PFTs 6/11/2024  FEV1- 1.61L, FVC-2.01L (78%), FEV1/FVC- 0.80; TLC-3.72L, RV-1.71L, uncDLCO- 12.61    Interpretation- \"DLCO uncorrected for hemoglobin is mildly reduced consistent with a pulmonary parenchymal or vascular abnormality or anemia. Spirometry, lung volumes, and resting and exercise oximetry are within normal limits. Since 11/06/2023 TLC is reduced   and FEV1, FVC, and DLCO are unchanged.\"         [facility] PFTs 11/6/2023-     FEV1- 1.6L, FVC-1.93L, FEV1/FVC- 0.82; TLC-4.27L, RV-2.34L, uncDLCO- 11.9   Interpretation- normal spirometry, TLC normal with RV/TLC ratio suggesting air trapping, mild diffusion defect    7/19/2023- Spirometry- FEV1-1.6L (81%), FVC- 1.95L (77%), no obstruction  TLC-3.79L (79%)  RV/TLC- 49%  uncDLCO- 15.29 ml/min/mmHg (82%)  Overall, not significant changes vs 6/2023 PFTs.  Now considered mild restriction with normal DLCO. Note, all values still very similar and likely just variance. General trend is that all values are mildly low or on low end of normal.     6MW- Date: 7/19/23   Distance-  411m " "(379LLN), bora spO2-91% (at very end, otherwise mid-90's)     PFT- Date: 6/7/2023 at [facility]  FEV1- 1.64L (80%), FVC-2.06L (78%), FEV1/FVC- 0.79, BDR-No; TLC-4.1 (87%), RV/TLC-50% uncDLCO- 13.49 ml/min/mmHg (74%)-    Interpretation- \"Abnormal study. Diffusing capacity is reduced consistent with a pulmonary parenchymal or vascular process. Oxygen saturations normal at rest and with exercise. Spirometry and lung volumes otherwise normal. No acute bronchodilator response.\"    Impression & recommendations:    Rosa was seen today for interstitial lung disease (ild).    Diagnoses and all orders for this visit:    Chronic cough  -     General PFT Lab (Please always keep checked); Future  -     Pulmonary Function Test; Future  -     fluticasone-vilanterol (BREO ELLIPTA) 100-25 MCG/ACT inhaler; Inhale 1 puff into the lungs daily.  -     fluticasone-salmeterol (ADVAIR HFA) 115-21 MCG/ACT inhaler; Inhale 2 puffs into the lungs 2 times daily.    Hypersensitivity pneumonia (H)    Small airways disease  -     fluticasone-vilanterol (BREO ELLIPTA) 100-25 MCG/ACT inhaler; Inhale 1 puff into the lungs daily.  -     fluticasone-salmeterol (ADVAIR HFA) 115-21 MCG/ACT inhaler; Inhale 2 puffs into the lungs 2 times daily.    Other orders  -     Adult Pulmonology Clinic Follow-Up Order (3 Month); Future      74 year old female w/ h/o life long non smoker, osteopenia, who presents for follow up of ILD and for her chronic cough.     Previous exposure hx- long term down feather products until recently (stopped in summer of 2023). Home from [id number] had mold and asbestos but was not involved in the clearing of it.   Serologies- +MARIALUISA and Rnp at [facility] 6/2023. Full ILD panel 7/2023 negative (including full SLE panel).  ILD conference 8/2023 result- HP.    Overall ddx currently based on imaging is fibrotic HP vs NSIP with small airways disease along with bronchiolitis obliterans.  Prominent feature of imaging has been small airways disease/air " trapping.      Considering her exposure history of long term down feather product exposure, HP is highest on ddx.  She did get rid of all her down feather products summer 2023.    Currently she does not have any exercise intolerance and while she is having desaturation, does not have any hypoxia.  6-minute walk today is consistent with previous 6-minute walk 1.5 years ago.  However her PFTs have shown a significant decline in her TLC accompanied by an insignificant decline in her FVC.  Her DLCO has remained in the low normal range.    While [facility] has the CT chest from April 2023, can only see the report from [facility]'s CT chest in June 2024.  The report states there has been some mild progression in that 14-month span.    She reports that due to her coughing she has been exercising less may be have a component of relative deconditioning.    Regarding her cough, no nighttime symptoms.  While she does have rhinitis with postnasal drip, would expect her positional changes to instigate some level of cough at night or early in the night.  She had a negative Bravo pH study and never really had any reflux symptoms; cough also never responded to PPI.  Cough is mostly dry with morning white sputum production and some intermittent production throughout the day.  Also environmental triggers as well as emotional triggers.  Cough is also caused her to develop a hoarse voice.    Suspecting that cough is from her interstitial lung disease/small airways disease.  Additionally there cough can also be triggered by irritable larynx syndrome from her long-term symptoms.    -Considering environmental stimuli, will start with corticosteroid/LABA see if that improves on her cough (will send multiple scripts and see which one is best covered); can consider escalating to FAM therapy  -She will trial this for 4 to 6 weeks and message via [facility] on if the therapy is helping.  -Obtain imagines from 6/2024 Ct Chest from [facility] and review imaging  personally  -If there was significant progression on previous imaging, or if there is any further progression on her PFTs or symptoms, will need to pursue therapy. Option include starting with trial of steroids before deciding on MMF vs Ofev.   -Continue Flonase for rhinitis/PND  -Consider ENT referral for vice hoarseness/ILS component to cough     RTC in 3 months with PFTs      55 minutes excluding the time spent on cigarette cessation was  spent on the date of the encounter doing chart review, history and exam, documentation and further activities as noted above.    The longitudinal plan of care for the diagnosis(es)/condition(s) as documented were addressed during this visit. Due to the added complexity in care, I will continue to support Rosa in the subsequent management and with ongoing continuity of care.    These conclusions are made at the best of one's knowledge and belief based on the provided evidence such as patient's history and allergy test results and they can change over time or can be incomplete because of missing information's.    I explained the lab values, imagings and findings to the patient.  Patient expressed understanding I did not recognize any barriers to the understanding of the patient.    The above note was dictated using voice recognition software and may include typographical errors. Please contact the author for any clarifications.    Hamlet Deluna MD  , Division of Pulmonary/Critical Care          Again, thank you for allowing me to participate in the care of your patient.        Sincerely,        Hamlet Deluna MD

## 2024-11-20 NOTE — NURSING NOTE
Chief Complaint   Patient presents with    Interstitial Lung Disease (ILD)     Follow up visit     Alex Salcedo CMA CMA at 4:25 PM on 11/20/2024

## 2024-11-20 NOTE — PATIENT INSTRUCTIONS
"Google \"lung.org and inhaler technique\" for videos reviewing how to use different inhaler and respiratory tools     https://www.lung.org/lung-health-diseases/lung-disease-lookup/asthma/treatment/devices      Because you are using an inhaler with an inhaled corticosteroid, please perform a deep gargle with water or mouthwash thoroughly (including all the way in the back of your throat) for several seconds in order to prevent developing thrush    Try for 4-6 weeks of the inhaler and let me know how the cough is going via mychart  "

## 2024-11-21 ENCOUNTER — TELEPHONE (OUTPATIENT)
Dept: PULMONOLOGY | Facility: CLINIC | Age: 74
End: 2024-11-21
Payer: MEDICARE

## 2024-11-21 LAB
DLCOUNC-%PRED-PRE: 79 %
DLCOUNC-PRE: 14.51 ML/MIN/MMHG
DLCOUNC-PRED: 18.32 ML/MIN/MMHG
ERV-%PRED-PRE: 60 %
ERV-PRE: 0.55 L
ERV-PRED: 0.92 L
EXPTIME-PRE: 5.28 SEC
FEF2575-%PRED-PRE: 125 %
FEF2575-PRE: 2.06 L/SEC
FEF2575-PRED: 1.65 L/SEC
FEFMAX-%PRED-PRE: 91 %
FEFMAX-PRE: 4.84 L/SEC
FEFMAX-PRED: 5.28 L/SEC
FEV1-%PRED-PRE: 77 %
FEV1-PRE: 1.49 L
FEV1FEV6-PRE: 86 %
FEV1FEV6-PRED: 79 %
FEV1FVC-PRE: 86 %
FEV1FVC-PRED: 79 %
FEV1SVC-PRE: 91 %
FEV1SVC-PRED: 65 %
FIFMAX-PRE: 3.19 L/SEC
FRCPLETH-%PRED-PRE: 83 %
FRCPLETH-PRE: 2.23 L
FRCPLETH-PRED: 2.66 L
FVC-%PRED-PRE: 71 %
FVC-PRE: 1.75 L
FVC-PRED: 2.45 L
IC-%PRED-PRE: 59 %
IC-PRE: 1.09 L
IC-PRED: 1.83 L
RVPLETH-%PRED-PRE: 81 %
RVPLETH-PRE: 1.68 L
RVPLETH-PRED: 2.06 L
TLCPLETH-%PRED-PRE: 69 %
TLCPLETH-PRE: 3.32 L
TLCPLETH-PRED: 4.77 L
VA-%PRED-PRE: 65 %
VA-PRE: 2.86 L
VC-%PRED-PRE: 55 %
VC-PRE: 1.64 L
VC-PRED: 2.96 L

## 2024-11-21 NOTE — TELEPHONE ENCOUNTER
Patient confirmed scheduled appointment:  Date: 03/05/2025  Time: 1:00PM  Visit type: RIL  Provider: JAMES  Location: [facility]  Testing/imaging: N/A  Additional notes: N/A

## 2024-11-23 NOTE — PROGRESS NOTES
Pulmonary Patient Follow Up Clinic Note   11/20/2024      PCP: No Ref-Primary, Physician    Reason for visit: ILD follow    Pulmonary HPI:   Rosa Mejia is a 74 year old female w/ h/o life long non smoker, osteopenia, who presents for follow up of ILD and for her chronic cough.     Pt presents with her .     Last [facility] visit 7/19/2023.   She has also been followed at [place] [address] appt previous to that visit and has also followed up there twice since the [facility] appt- last appt with [facility] was 6/11/2024.       Previous exposure hx- long term down feather products until recently (stopped in summer of 2023). Home from 1490-5966 had mold and asbestos but was not involved in the clearing of it.   Repeat full ILD serologies (along with SLE serologies after a +MARIALUISA Hep-2 sub and RNP at [facility]) were negative. She has no CTD symptoms previously.   PFTs- mild restriction with normal DLCO.  6MW- normal distance and desaturation.   She also had chronic non-productive cough- with +/- mild improvement with PPI and PND contributing.   ILD conference- HP. Since she just removed the down feather products, plan was to monitor and follow up.     Following appt, in September 2023 she tested +COVID-19. She met criteria for mild disease. She was treated with Paxlovid in the outpt setting.     Patient's biggest symptom at this point is a chronic cough.  Previously cough was nonproductive but over the last year and a half it has become productive.   She has also developed hoarseness of voice.  Cough is throughout the day and is productive with white sputum intermittently (mostly in the morning).  Cough is worse in the morning but is also triggered by environmental stimuli (perfumes, diffuser oils, colder temperatures) as well as laughter and talking as well as exercise.  As such she has not been exercising as much.  She notes that her cough is not awakening her at night.  She does have some postnasal drip with rhinitis  which she is treated with Flonase; it has helped control her rhinitis and postnasal drip but has not improved her cough.    No sinus issues.  Seasonal pollen changes do not seem to affect the cough.  She also notes that her cough is not positional.  She was treated with a PPI for period time but it really did not seem to make any major difference in her cough.  She had a Bravo pH study which was negative.  After the study she stopped the PPI.    She had a repeat CT chest in June 2024 at [facility] with report stating potentially mild progression on imaging compared to April 2023.    In the June 2024 appointment at [facility], Dr. Varner discussed potentially mycophenolate and Ofev but since patient has been overall asymptomatic from a dyspnea standpoint would hold off unless there is further progression.    Of note she also had 2 intermittent results from sweat chloride sweat testing with a follow-up genotype for CF genes which was negative.    She has had no infectious symptoms nor any acute symptoms on her chronic symptoms regarding infections.    She has no chest tightness or wheezing.  No chest pain.      Review of systems: a complete 12-point ROS conducted, & found to be negative w/ exceptions as noted in the HPI.    Reviewed PMH, PSH, FH, SH    Medications:  Current Outpatient Medications   Medication Sig Dispense Refill    benzonatate (TESSALON) 100 MG capsule Take 100 mg by mouth 3 times daily as needed for cough.      Calcium Carb-Cholecalciferol 500-10 MG-MCG CHEW Take 1 tablet by mouth      cyanocobalamin 1000 MCG sublingual tablet Place under the tongue daily      fluticasone-salmeterol (ADVAIR HFA) 115-21 MCG/ACT inhaler Inhale 2 puffs into the lungs 2 times daily. 12 g 0    fluticasone-vilanterol (BREO ELLIPTA) 100-25 MCG/ACT inhaler Inhale 1 puff into the lungs daily. 28 each 0     No current facility-administered medications for this visit.       Allergies:  No Known Allergies    Physical examination:  /73   " Pulse 70   Ht 1.6 m (5' 3\")   Wt 53.1 kg (117 lb)   SpO2 97%   BMI 20.73 kg/m      General: NAD  Eyes: Anicteric sclera  CV: RR, no m/c/r  Lungs: +BS. No wheezing. Mild inspiratory rales in bases of lungs in posterior lung fields. No squeaks or egophony.   Abd: Soft, NT, ND  Ext: WWP, no BLE edema. No clubbing  Skin: No rashes, cyanosis, or jaundice  Neuro: No focal deficits  Psych: Euthymic, normal affect, good eye contact    Labs: reviewed in [facility] & personally interpreted.     June 2023 labs at [facility]-   HP panel 1 and Daniel panel, MARIALUISA, RNP, SSA/SSB, SM, Scl-70, Ary-1, CBC, CMP.  All negative except  MARIALUISA- 1:320 homogenous on immunofluorescence and RNP- 1.8  CMP with HCO3-32.  CBC w/diff- abs eosinophils 0.16    Full ILD serology panel 7/2023  -Negative, including repeat full SLE serologies    Imaging: reviewed in [facility] & personally interpreted. Below are the interpretations from the formal Radiology review.    Personal review of outside CT (high res, 4/28/2023)-  Apical b/l scarring with consolidation on R and small area of bronchiectasis within it  Mild peripheral reticular changes with areas of GGO (subtle until the lower lobes) with mosaicism all throughout/ air trapping evident on expiratory films.  Distal esophagus is mildly dilated/thickened.  PA normal size.   Calcified granuloma in right lung.    Ddx- Overall, alternative to UIP- ddx HP, NSIP with small airways disease.       [facility] Ct Chest 6/2024- report only viewed:    \"FINDINGS:   There is a fibrotic interstitial abnormality with reticular and groundglass opacities with a peripheral and basilar predominance. Associated traction bronchiectasis in some areas. No definite honeycombing or subpleural sparing. There is also prominent   mosaic attenuation of the lung parenchyma with moderate to severe patchy bilateral areas of air trapping on the expiratory images (series 4). The findings are indeterminate for UIP. The prominent mosaic attenuation raises " "the possibility of   hypersensitivity pneumonitis, though the peripheral and lower lung predominance of the fibrosis is not typically seen with HP.   There has been some worsening of the fibrotic interstitial abnormality since the prior exam with increased fibrosis and groundglass opacity in the left lower lobe and inferior lingula.     Subpleural fibrosis with traction bronchiectasis and calcification in the lung apices.     Bronchiectasis in the inferior lingula and right middle lobe.   Expiratory images demonstrate excessive dynamic airway collapse with near-complete closure of the bronchus intermedius (series 4, images 115-125). The trachea and mainstem bronchi remain widely patent during expiration.   Right middle lobe calcified granulomas. No pleural effusion.     No thoracic lymphadenopathy. A few mildly prominent subcentimeter mediastinal nodes are likely reactive. Calcified right hilar lymph nodes, likely sequela of old granulomatous infection. No pericardial effusion. Bilateral small thyroid nodules measuring   up to 6 mm require no specific imaging follow-up.     Tiny hypoattenuating lesion in the right hepatic lobe, too small to characterize but likely cyst versus hemangioma. Bilateral renal cysts. Soft tissue thickening of the left adrenal gland.     Impression    Fibrotic interstitial lung disease which is indeterminate for UIP. Differential includes fibrotic NSIP. There is prominent mosaic attenuation and expiratory air trapping, which can be seen with hypersensitivity pneumonitis, though the peripheral and  lower lung distribution of the fibrotic changes is not typical for hypersensitivity pneumonitis. Connective tissue related ILD with a component of a obliterative bronchiolitis is possible. There has been mild worsening of the interstitial fibrosis since  the prior outside exam.\"      PFT:  Most Recent [facility] Pulmonary Function Testing (FVC/FEV1 only)  FVC-Pre   Date Value Ref Range Status " "  11/20/2024 1.75 L    07/19/2023 1.95 L      FVC-%Pred-Pre   Date Value Ref Range Status   11/20/2024 71 %    07/19/2023 77 %      FEV1-Pre   Date Value Ref Range Status   11/20/2024 1.49 L    07/19/2023 1.60 L      FEV1-%Pred-Pre   Date Value Ref Range Status   11/20/2024 77 %    07/19/2023 81 %      11/20/2024  Spirometry- FEV1-1.49L (77%), FVC- 1.75L (71%), no obstruction  TLC-3.32L (69%)  RV/TLC- 49%  uncDLCO- 14.51 ml/min/mmHg (79%)  -Mildly reduced FVC and TLC (mild restriction) with normal DLCO  -Significant decline in TLC compared to previous  6MW- normal distance, desat without hypoxia (spO2-94%)    [facility] PFTs 6/11/2024  FEV1- 1.61L, FVC-2.01L (78%), FEV1/FVC- 0.80; TLC-3.72L, RV-1.71L, uncDLCO- 12.61    Interpretation- \"DLCO uncorrected for hemoglobin is mildly reduced consistent with a pulmonary parenchymal or vascular abnormality or anemia. Spirometry, lung volumes, and resting and exercise oximetry are within normal limits. Since 11/06/2023 TLC is reduced   and FEV1, FVC, and DLCO are unchanged.\"         [facility] PFTs 11/6/2023-     FEV1- 1.6L, FVC-1.93L, FEV1/FVC- 0.82; TLC-4.27L, RV-2.34L, uncDLCO- 11.9   Interpretation- normal spirometry, TLC normal with RV/TLC ratio suggesting air trapping, mild diffusion defect    7/19/2023- Spirometry- FEV1-1.6L (81%), FVC- 1.95L (77%), no obstruction  TLC-3.79L (79%)  RV/TLC- 49%  uncDLCO- 15.29 ml/min/mmHg (82%)  Overall, not significant changes vs 6/2023 PFTs.  Now considered mild restriction with normal DLCO. Note, all values still very similar and likely just variance. General trend is that all values are mildly low or on low end of normal.     6MW- Date: 7/19/23   Distance-  411m (379LLN), bora spO2-91% (at very end, otherwise mid-90's)     PFT- Date: 6/7/2023 at [facility]  FEV1- 1.64L (80%), FVC-2.06L (78%), FEV1/FVC- 0.79, BDR-No; TLC-4.1 (87%), RV/TLC-50% uncDLCO- 13.49 ml/min/mmHg (74%)-    Interpretation- \"Abnormal study. Diffusing capacity is reduced " "consistent with a pulmonary parenchymal or vascular process. Oxygen saturations normal at rest and with exercise. Spirometry and lung volumes otherwise normal. No acute bronchodilator response.\"    Impression & recommendations:    Rosa was seen today for interstitial lung disease (ild).    Diagnoses and all orders for this visit:    Chronic cough  -     General PFT Lab (Please always keep checked); Future  -     Pulmonary Function Test; Future  -     fluticasone-vilanterol (BREO ELLIPTA) 100-25 MCG/ACT inhaler; Inhale 1 puff into the lungs daily.  -     fluticasone-salmeterol (ADVAIR HFA) 115-21 MCG/ACT inhaler; Inhale 2 puffs into the lungs 2 times daily.    Hypersensitivity pneumonia (H)    Small airways disease  -     fluticasone-vilanterol (BREO ELLIPTA) 100-25 MCG/ACT inhaler; Inhale 1 puff into the lungs daily.  -     fluticasone-salmeterol (ADVAIR HFA) 115-21 MCG/ACT inhaler; Inhale 2 puffs into the lungs 2 times daily.    Other orders  -     Adult Pulmonology Clinic Follow-Up Order (3 Month); Future      74 year old female w/ h/o life long non smoker, osteopenia, who presents for follow up of ILD and for her chronic cough.     Previous exposure hx- long term down feather products until recently (stopped in summer of 2023). Home from [address]34 had mold and asbestos but was not involved in the clearing of it.   Serologies- +MARIALUISA and Rnp at [facility] 6/2023. Full ILD panel 7/2023 negative (including full SLE panel).  ILD conference 8/2023 result- HP.    Overall ddx currently based on imaging is fibrotic HP vs NSIP with small airways disease along with bronchiolitis obliterans.  Prominent feature of imaging has been small airways disease/air trapping.      Considering her exposure history of long term down feather product exposure, HP is highest on ddx.  She did get rid of all her down feather products summer 2023.    Currently she does not have any exercise intolerance and while she is having desaturation, does " not have any hypoxia.  6-minute walk today is consistent with previous 6-minute walk 1.5 years ago.  However her PFTs have shown a significant decline in her TLC accompanied by an insignificant decline in her FVC.  Her DLCO has remained in the low normal range.    While [facility] has the CT chest from April 2023, can only see the report from [facility]'s CT chest in June 2024.  The report states there has been some mild progression in that 14-month span.    She reports that due to her coughing she has been exercising less may be have a component of relative deconditioning.    Regarding her cough, no nighttime symptoms.  While she does have rhinitis with postnasal drip, would expect her positional changes to instigate some level of cough at night or early in the night.  She had a negative Bravo pH study and never really had any reflux symptoms; cough also never responded to PPI.  Cough is mostly dry with morning white sputum production and some intermittent production throughout the day.  Also environmental triggers as well as emotional triggers.  Cough is also caused her to develop a hoarse voice.    Suspecting that cough is from her interstitial lung disease/small airways disease.  Additionally there cough can also be triggered by irritable larynx syndrome from her long-term symptoms.    -Considering environmental stimuli, will start with corticosteroid/LABA see if that improves on her cough (will send multiple scripts and see which one is best covered); can consider escalating to FAM therapy  -She will trial this for 4 to 6 weeks and message via [employer] on if the therapy is helping.  -Obtain imagines from 6/2024 Ct Chest from [facility] and review imaging personally  -If there was significant progression on previous imaging, or if there is any further progression on her PFTs or symptoms, will need to pursue therapy. Option include starting with trial of steroids before deciding on MMF vs Ofev.   -Continue Flonase for  rhinitis/PND  -Consider ENT referral for vice hoarseness/ILS component to cough     RTC in 3 months with PFTs      55 minutes excluding the time spent on cigarette cessation was  spent on the date of the encounter doing chart review, history and exam, documentation and further activities as noted above.    The longitudinal plan of care for the diagnosis(es)/condition(s) as documented were addressed during this visit. Due to the added complexity in care, I will continue to support Rosa in the subsequent management and with ongoing continuity of care.    These conclusions are made at the best of one's knowledge and belief based on the provided evidence such as patient's history and allergy test results and they can change over time or can be incomplete because of missing information's.    I explained the lab values, imagings and findings to the patient.  Patient expressed understanding I did not recognize any barriers to the understanding of the patient.    The above note was dictated using voice recognition software and may include typographical errors. Please contact the author for any clarifications.    Hamlet Deluna MD  , Division of Pulmonary/Critical Care

## 2024-12-17 ENCOUNTER — TELEPHONE (OUTPATIENT)
Dept: PULMONOLOGY | Facility: CLINIC | Age: 74
End: 2024-12-17
Payer: MEDICARE

## 2024-12-17 DIAGNOSIS — J98.4 SMALL AIRWAYS DISEASE: Primary | ICD-10-CM

## 2024-12-17 RX ORDER — FLUTICASONE PROPIONATE AND SALMETEROL XINAFOATE 115; 21 UG/1; UG/1
1 AEROSOL, METERED RESPIRATORY (INHALATION) 2 TIMES DAILY
Qty: 12 G | Refills: 3 | Status: SHIPPED | OUTPATIENT
Start: 2024-12-17

## 2024-12-18 ENCOUNTER — TELEPHONE (OUTPATIENT)
Dept: PULMONOLOGY | Facility: CLINIC | Age: 74
End: 2024-12-18
Payer: MEDICARE

## 2024-12-18 NOTE — TELEPHONE ENCOUNTER
----- Message from Ann Padron sent at 12/18/2024  2:46 PM CST -----  Regarding: RE: inhaler fun  Hi Jessica,    It looks like the main issue is that the patient has not met her deductible yet. And these prices are while the Advair HFA is currently billed to insurance, so if that gets reversed, the prices could change. These prices are for one inhaler each.    Dulera 100/5 = $213.87 ($76.47 remaining of deductible, plus $137.40 coinsurance)  Symbicort 80/4.5 = PA Required (both brand and generic)  Wixela 100/50 = PA Required (Advair Diskus preferred)  Advair Diskus 100/50 = $121.47 ($76.47 remaining of deductible, plus $45 co-pay). So once her deductible is met, her co-pay should only be $45.  Breo 100/25 = $121.47 ($76.47 remaining of deductible, plus $45 co-pay). So once her deductible is met, her co-pay should only be $45.    I ran a test claim for Breo because it says in the rejection Breo, Advair and Dulera are preferred. Advair Diskus or Breo are going to be the best route. Once she meets her deductible, the cost should drop to $45 per month. Unfortunately I'm unable to determine her deductible, so if the Advair HFA claim gets reversed, the prices until she meets her deductible are going to be higher than what I've even listed here.    Hope that helps!  Ann  ----- Message -----  From: Jessica Nassar RN  Sent: 12/18/2024   1:20 PM CST  To: Ann Padron  Subject: inhaler fun                                      Good Afternoon Ann    I think you have been able to help me in the past or least run prescriptions.   Patient initially had fluticasone-salmeterol generic written but it was not covered by insurance and $900.  We changed to preferred brand name advair hfa which after coverage is $300  Pharmacist told me Breo is also not covered and several hundred dollars.     Is there a way to see if any of the following are covered?  Dulera 100/5  Symbicort 80/4.5  Wixela 100/50  Advair diskus  100/50    Thank you!   Jessica Nassar RN

## 2024-12-18 NOTE — TELEPHONE ENCOUNTER
No cheaper options through [employer]   Advair diskus is $63 on [employer]     Call placed to patient  MITCHELL Nassar RN

## 2025-03-10 ENCOUNTER — TELEPHONE (OUTPATIENT)
Dept: PULMONOLOGY | Facility: CLINIC | Age: 75
End: 2025-03-10
Payer: MEDICARE

## 2025-03-10 NOTE — TELEPHONE ENCOUNTER
Patient Contacted for the patient to call back and schedule the following:    Appointment type: Return ILD  Provider: Regi  Return date: Around 9/7/2025  Specialty phone number: [phone]  Additional appointment(s) needed: full pft  Additonal Notes: na

## 2025-04-14 ENCOUNTER — TELEPHONE (OUTPATIENT)
Dept: PULMONOLOGY | Facility: CLINIC | Age: 75
End: 2025-04-14
Payer: MEDICARE

## 2025-04-14 DIAGNOSIS — J84.9 ILD (INTERSTITIAL LUNG DISEASE) (H): ICD-10-CM

## 2025-04-14 NOTE — TELEPHONE ENCOUNTER
Returned call to patient    Interested in starting prednisone, was discussed at appt per note  Discussed sputum sample as well, did not see in note    Writer will send message to Dr. Deluna to review    Jessica Nassar RN

## 2025-04-15 ENCOUNTER — TELEPHONE (OUTPATIENT)
Dept: PULMONOLOGY | Facility: CLINIC | Age: 75
End: 2025-04-15
Payer: MEDICARE

## 2025-04-15 RX ORDER — SULFAMETHOXAZOLE AND TRIMETHOPRIM 800; 160 MG/1; MG/1
TABLET ORAL
Qty: 24 TABLET | Refills: 0 | Status: SHIPPED | OUTPATIENT
Start: 2025-04-15

## 2025-04-15 RX ORDER — PREDNISONE 10 MG/1
TABLET ORAL
Qty: 179 TABLET | Refills: 0 | Status: SHIPPED | OUTPATIENT
Start: 2025-04-15 | End: 2025-07-01

## 2025-04-15 NOTE — TELEPHONE ENCOUNTER
Patient Contacted for the patient to call back and schedule the following:    Appointment type: LABS AND FULL PFT  Provider: JAMES  Return date: 4/21/2025  Specialty phone number: [phone] OPT 1  Additional appointment(s) needed: N/A  Additonal Notes: DID NOT SCHEDULE PFT ([facility]) BECAUSE PT WANTED LABS RESULTS FIRST AND IS NOT TAKING THE STEROID YET. SCHEDULED LABS 4/21

## 2025-04-15 NOTE — TELEPHONE ENCOUNTER
I was suggesting a sputum culture in exploration of her cough to ensure no colonization of her airways. Low suspicion overall though would not hurt to check.  And that would be great if you would like to place the orders for the prednisone.  Will start at 30 mg daily for 6 weeks then taper by 5mg every week until off. She would need to be on 1 tablet DS Bactrim MWF until on a prednisone dosage of <20mg/day.  I would like to see if any improvement on Pfts at 8 weeks after starting the prednisone to assess if any improvement (and thus benefit with starting MMF). Can you help arrange for full PFTs in about 8 weeks as well?    Thanks!    AM     Writer placed call to patient   Reviewed Dr. Deluna's plan  Patient agreeable to plan  Orders placed  Message sent to  to help schedule PFT and lab appt    Jessica Nassar RN

## 2025-04-16 ENCOUNTER — DOCUMENTATION ONLY (OUTPATIENT)
Dept: LAB | Facility: CLINIC | Age: 75
End: 2025-04-16
Payer: MEDICARE

## 2025-04-16 NOTE — PROGRESS NOTES
Patient has a lab only appointment on Monday 4/21 in [place] for labs for Dr. Deluna. The only orders in the patient's chart currently are for sputum testing and the expected date is not until July.    Please advise if patient is just coming to  sputum collection container or if there is blood work to be ordered as well.     Thank you,  Richelle Willoughby MLT on 4/16/2025 at 3:19 PM

## 2025-04-21 ENCOUNTER — LAB (OUTPATIENT)
Dept: LAB | Facility: CLINIC | Age: 75
End: 2025-04-21
Payer: MEDICARE

## 2025-04-21 DIAGNOSIS — J84.9 ILD (INTERSTITIAL LUNG DISEASE) (H): ICD-10-CM

## 2025-04-21 PROCEDURE — 87070 CULTURE OTHR SPECIMN AEROBIC: CPT

## 2025-04-21 PROCEDURE — 87205 SMEAR GRAM STAIN: CPT

## 2025-04-22 ENCOUNTER — TELEPHONE (OUTPATIENT)
Dept: PULMONOLOGY | Facility: CLINIC | Age: 75
End: 2025-04-22
Payer: MEDICARE

## 2025-04-22 NOTE — TELEPHONE ENCOUNTER
Patient Contacted for the patient to call back and schedule the following:    Appointment type: PFT  Provider: Regi  Return date: Next Avail  Specialty phone number: [phone]  Additional appointment(s) needed: full pft  Additonal Notes: after labs per note.

## 2025-04-23 LAB
BACTERIA SPT CULT: NORMAL
GRAM STAIN RESULT: NORMAL

## 2025-05-01 ENCOUNTER — TELEPHONE (OUTPATIENT)
Dept: PULMONOLOGY | Facility: CLINIC | Age: 75
End: 2025-05-01
Payer: MEDICARE

## 2025-05-01 NOTE — TELEPHONE ENCOUNTER
Pt inquired about obtaining nutrition power point slides from Patient Education Day since she did not attend that session, she went to a different session. Explained we had a Pt Ed Day debriefing and discussed this topic as it has been requested previously at past education day's. In the past, there was one time where they spent time and money to set up a website with resources from Pt Ed Day. It was available for 2 months and only a handful of people ever accessed the resources so it was not worthwhile to do again.     Pt c/o bothersome mucous production and wondering if there are any dietary changes she can make to reduce mucous. Reviewed hydration and drinking 2L water, herbal tea daily to thin secretions and possible dietary changes. Recommended working with a dietician can be helpful to identify any dietary triggers. Pt expressed understanding.     Suzanna Anderson, RN, BSN  ILD Nurse   [phone]

## 2025-05-02 RX ORDER — ALBUTEROL SULFATE 0.83 MG/ML
2.5 SOLUTION RESPIRATORY (INHALATION) ONCE
Status: COMPLETED | OUTPATIENT
Start: 2025-05-05 | End: 2025-05-05

## 2025-05-05 ENCOUNTER — TELEPHONE (OUTPATIENT)
Dept: PULMONOLOGY | Facility: CLINIC | Age: 75
End: 2025-05-05
Payer: MEDICARE

## 2025-05-05 ENCOUNTER — HOSPITAL ENCOUNTER (OUTPATIENT)
Dept: RESPIRATORY THERAPY | Facility: CLINIC | Age: 75
Discharge: HOME | End: 2025-05-05
Attending: INTERNAL MEDICINE | Admitting: INTERNAL MEDICINE
Payer: MEDICARE

## 2025-05-05 DIAGNOSIS — J84.9 ILD (INTERSTITIAL LUNG DISEASE) (H): Primary | ICD-10-CM

## 2025-05-05 DIAGNOSIS — J67.9 HYPERSENSITIVITY PNEUMONIA (H): ICD-10-CM

## 2025-05-05 LAB
DLCOCOR-%PRED-PRE: 71 %
DLCOCOR-PRE: 13.13 ML/MIN/MMHG
DLCOUNC-%PRED-PRE: 74 %
DLCOUNC-PRE: 13.62 ML/MIN/MMHG
DLCOUNC-PRED: 18.29 ML/MIN/MMHG
ERV-%PRED-PRE: 65 %
ERV-PRE: 0.6 L
ERV-PRED: 0.91 L
EXPTIME-PRE: 5.33 SEC
FEF2575-%PRED-POST: 116 %
FEF2575-%PRED-PRE: 117 %
FEF2575-POST: 1.9 L/SEC
FEF2575-PRE: 1.92 L/SEC
FEF2575-PRED: 1.63 L/SEC
FEFMAX-%PRED-PRE: 108 %
FEFMAX-PRE: 5.69 L/SEC
FEFMAX-PRED: 5.25 L/SEC
FEV1-%PRED-PRE: 78 %
FEV1-PRE: 1.49 L
FEV1FEV6-PRE: 86 %
FEV1FEV6-PRED: 79 %
FEV1FVC-PRE: 86 %
FEV1FVC-PRED: 79 %
FEV1SVC-PRE: 88 %
FEV1SVC-PRED: 65 %
FIFMAX-PRE: 1.54 L/SEC
FRCPLETH-%PRED-PRE: 91 %
FRCPLETH-PRE: 2.56 L
FRCPLETH-PRED: 2.81 L
FVC-%PRED-PRE: 70 %
FVC-PRE: 1.73 L
FVC-PRED: 2.44 L
HGB BLD-MCNC: 14.7 G/DL (ref 11.7–15.7)
IC-%PRED-PRE: 60 %
IC-PRE: 1.09 L
IC-PRED: 1.82 L
RVPLETH-%PRED-PRE: 95 %
RVPLETH-PRE: 1.97 L
RVPLETH-PRED: 2.07 L
TLCPLETH-%PRED-PRE: 76 %
TLCPLETH-PRE: 3.66 L
TLCPLETH-PRED: 4.77 L
VA-%PRED-PRE: 72 %
VA-PRE: 3.18 L
VC-%PRED-PRE: 57 %
VC-PRE: 1.69 L
VC-PRED: 2.95 L

## 2025-05-05 PROCEDURE — 94729 DIFFUSING CAPACITY: CPT

## 2025-05-05 PROCEDURE — 94726 PLETHYSMOGRAPHY LUNG VOLUMES: CPT

## 2025-05-05 PROCEDURE — 36415 COLL VENOUS BLD VENIPUNCTURE: CPT | Performed by: INTERNAL MEDICINE

## 2025-05-05 PROCEDURE — 999N000157 HC STATISTIC RCP TIME EA 10 MIN

## 2025-05-05 PROCEDURE — 94060 EVALUATION OF WHEEZING: CPT

## 2025-05-05 PROCEDURE — 250N000009 HC RX 250: Performed by: INTERNAL MEDICINE

## 2025-05-05 PROCEDURE — 85018 HEMOGLOBIN: CPT | Performed by: INTERNAL MEDICINE

## 2025-05-05 RX ADMIN — ALBUTEROL SULFATE 2.5 MG: 2.5 SOLUTION RESPIRATORY (INHALATION) at 09:00

## 2025-05-05 NOTE — PROGRESS NOTES
RESPIRATORY CARE NOTE    Complete Pulmonary Function Test completed by patient.  Good patient effort and cooperation. Albuterol 2.5 mg neb given for bronchodilation.  The results of this test meet the ATS standards for acceptability and repeatability. PT returned to baseline and left in no distress.    Pinky Casiano, RT  5/5/2025

## 2025-05-05 NOTE — TELEPHONE ENCOUNTER
Spoke to pt, explained there is not anything we can offer from a nutrition standpoint to reduce mucous production. Pt appreciative of call and expressed understanding.     Suzanna Anderson, RN, BSN  ILD Nurse   [phone]      ----- Message from Hamlet Deluna sent at 5/2/2025  7:27 PM CDT -----  Regarding: RE: C/o mucous  No I do not think there is much that can be done nutritionally.    ----- Message -----  From: Suzanna Anderson, RN  Sent: 5/2/2025   3:13 PM CDT  To: Hamlet Deluna MD; Suzanna Anderson RN  Subject: C/o mucous                                       Hi Hamlet,     Pt c/o bothersome mucous production and wondering if there are any dietary changes she can make to reduce mucous. Reviewed hydration and drinking 2L water, herbal tea daily to thin secretions. I reached out to the transplant dietician Ronna and she said she doesn't educate much on mucous and nutrition and she is not sure a general nutrition referral would even be helpful.     Do you have any suggestions?    Suzanna

## 2025-05-12 ENCOUNTER — TELEPHONE (OUTPATIENT)
Dept: PULMONOLOGY | Facility: CLINIC | Age: 75
End: 2025-05-12
Payer: MEDICARE

## 2025-05-12 DIAGNOSIS — J98.4 SMALL AIRWAYS DISEASE: Primary | ICD-10-CM

## 2025-05-12 RX ORDER — ALBUTEROL SULFATE 90 UG/1
2 INHALANT RESPIRATORY (INHALATION) EVERY 6 HOURS PRN
Qty: 18 G | Refills: 5 | Status: SHIPPED | OUTPATIENT
Start: 2025-05-12

## 2025-05-12 NOTE — TELEPHONE ENCOUNTER
LM 5/9 at 4:33 pm     Question about Mucolytic neb treatment, if Dr Deluna would recommend to help break up her mucus?      [phone]   ==============================  Spoke to patient about her cough. Discussed options for helping with cough. Plan to try Aerobika device. We will plan to mail device and send her albuterol inhaler to use as needed. Told her to try to use inhaler before using Aerobika. Patient agreed with plan. If does not help and mucus still thick told her to call back and we can consider mucolytic nebs.    Patient also mentioned she was interested in studies if she qualifies. Message sent to study coordinators.

## 2025-05-21 ENCOUNTER — TELEPHONE (OUTPATIENT)
Dept: PULMONOLOGY | Facility: CLINIC | Age: 75
End: 2025-05-21
Payer: MEDICARE

## 2025-05-21 NOTE — TELEPHONE ENCOUNTER
[facility] Call Center    Phone Message    May a detailed message be left on voicemail: yes     Reason for Call: Pt calling to speak with one of Dr. Deluna's nurses-she has some questions regarding her osteopenia/osteoporosis and prednisone usage. Would like a call back when possible.    Action Taken: Other: Pulm    Travel Screening: Not Applicable

## 2025-05-22 NOTE — TELEPHONE ENCOUNTER
RN spoke with pt regarding her prednisone concerns. We discussed benefit vs risk. She is taking supplemental calcium and vit D. PT asked RN to verify with Dr. Deluna dose and if anything changes in her plan. Se was dx board line years ago and has not had scan in 5 years. RN sent message to provider to advise.   Ronna Schulz RN BSN

## 2025-06-29 ENCOUNTER — HEALTH MAINTENANCE LETTER (OUTPATIENT)
Age: 75
End: 2025-06-29